# Patient Record
Sex: FEMALE | Race: BLACK OR AFRICAN AMERICAN
[De-identification: names, ages, dates, MRNs, and addresses within clinical notes are randomized per-mention and may not be internally consistent; named-entity substitution may affect disease eponyms.]

---

## 2019-01-16 VITALS — SYSTOLIC BLOOD PRESSURE: 158 MMHG | DIASTOLIC BLOOD PRESSURE: 76 MMHG

## 2021-06-02 ENCOUNTER — HOSPITAL ENCOUNTER (OUTPATIENT)
Dept: HOSPITAL 63 - MAMMO | Age: 86
End: 2021-06-02
Attending: SPECIALIST
Payer: MEDICARE

## 2021-06-02 DIAGNOSIS — N63.20: Primary | ICD-10-CM

## 2021-06-02 DIAGNOSIS — N63.10: ICD-10-CM

## 2021-06-02 PROCEDURE — 77066 DX MAMMO INCL CAD BI: CPT

## 2021-06-02 PROCEDURE — 76641 ULTRASOUND BREAST COMPLETE: CPT

## 2021-06-02 NOTE — RAD
DATE: 6/2/2021



EXAM: DIGITAL DIAGNOSTIC BILATERAL, BREAST BILATERAL



HISTORY: Right breast lump



COMPARISON: None



This study was interpreted with the benefit of Computerized Aided Detection

(CAD).





Breast Density: DENSE The breast parenchyma is dense, which could reduce the

sensitivity of mammography. Breast parenchyma level density D.





FINDINGS: The mammogram is extremely limited due to difficulty with patient

positioning, small breast size, and the presence of numerous skin moles.

Limited CC and LM views of the right breast were obtained and LM view only of

the left breast was obtained.  The technician made multiple attempts for

better images but this was the best possible imaging. 



Right breast: There is a large masslike area in the retroareolar region

measuring at least 3.9 x 3.0 cm. There is some distortion of the surrounding

breast tissue. The breast is very nodular in appearance. There are some

calcifications within the mass.



Left breast: Limited single view of the left breast demonstrates multiple

nodular densities in the inferior breast. These may correspond with moles.

There are scattered calcifications. There is diffusely increased breast

density which may be technical and due to limitations in the exam.



ULTRASOUND:



Right breast: The right breast was scanned in the area of palpable concern.

There is a large shadowing heterogeneous mass just deep to the skin at 7:00 in

the periareolar region of the right breast measuring 3.6 x 3.8 x 1.8 cm. This

has irregular borders and multiple echogenic foci consistent with

calcifications. The mass is reportedly nonmobile and hard. The breast is

difficult to penetrate. No axillary lymphadenopathy.



Left breast: The inferior left breast was scanned from 5:00 to 7:00. There is

normal fibroglandular tissue. No mass or cyst. Findings on mammogram may

correspond with skin moles.



IMPRESSION:

1. 3.8 cm mass in the right breast suspicious for malignancy.



2. Technically very limited mammogram due to patient immobility and the

presence of multiple skin moles. Additional suspicious masses or other

abnormalities could be missed in either breast.



A message regarding findings and recommendations with a callback number were

left for Dr. Jesús Soto at 4:30 PM on 6/2/2021.



BI-RADS CATEGORY: 5 HIGHLY SUGGESTIVE MALIGNANCY



RECOMMENDED FOLLOW-UP: BIO BIOPSY RECOMMENDED



PQRS compliance statement: Patient information was entered into a reminder

system with a target due date for the next mammogram.



Mammography is a sensitive method for finding small breast cancers, but it

does not detect them all and is not a substitute for careful clinical

examination.  A negative mammogram does not negate a clinically suspicious

finding and should not result in delay in biopsying a clinically suspicious

abnormality.



"Our facility is accredited by the [employer] Mammography

Program."

## 2021-06-09 ENCOUNTER — HOSPITAL ENCOUNTER (INPATIENT)
Dept: HOSPITAL 63 - ER | Age: 86
LOS: 2 days | Discharge: HOSPICE | DRG: 640 | End: 2021-06-11
Attending: HOSPITALIST | Admitting: HOSPITALIST
Payer: MEDICARE

## 2021-06-09 VITALS — BODY MASS INDEX: 18.91 KG/M2 | HEIGHT: 60 IN | WEIGHT: 96.34 LBS

## 2021-06-09 VITALS — SYSTOLIC BLOOD PRESSURE: 98 MMHG | DIASTOLIC BLOOD PRESSURE: 57 MMHG

## 2021-06-09 DIAGNOSIS — I12.9: ICD-10-CM

## 2021-06-09 DIAGNOSIS — E87.0: ICD-10-CM

## 2021-06-09 DIAGNOSIS — I24.8: ICD-10-CM

## 2021-06-09 DIAGNOSIS — E43: ICD-10-CM

## 2021-06-09 DIAGNOSIS — E86.0: Primary | ICD-10-CM

## 2021-06-09 DIAGNOSIS — F03.90: ICD-10-CM

## 2021-06-09 DIAGNOSIS — D63.8: ICD-10-CM

## 2021-06-09 DIAGNOSIS — K59.00: ICD-10-CM

## 2021-06-09 DIAGNOSIS — Z51.5: ICD-10-CM

## 2021-06-09 DIAGNOSIS — N63.10: ICD-10-CM

## 2021-06-09 DIAGNOSIS — R53.81: ICD-10-CM

## 2021-06-09 DIAGNOSIS — N18.9: ICD-10-CM

## 2021-06-09 DIAGNOSIS — Z66: ICD-10-CM

## 2021-06-09 DIAGNOSIS — E87.8: ICD-10-CM

## 2021-06-09 DIAGNOSIS — R64: ICD-10-CM

## 2021-06-09 LAB
ALBUMIN SERPL-MCNC: 3.2 G/DL (ref 3.4–5)
ALBUMIN/GLOB SERPL: 0.9 {RATIO} (ref 1–1.7)
ALP SERPL-CCNC: 271 U/L (ref 46–116)
ALT SERPL-CCNC: 14 U/L (ref 14–59)
AMM URATE CRY UR QL COMP ASSIST: PRESENT /HPF
ANION GAP SERPL CALC-SCNC: 18 MMOL/L (ref 6–14)
APTT PPP: YELLOW S
AST SERPL-CCNC: 36 U/L (ref 15–37)
BACTERIA #/AREA URNS HPF: 0 /HPF
BASOPHILS # BLD AUTO: 0.1 X10^3/UL (ref 0–0.2)
BASOPHILS NFR BLD: 1 % (ref 0–3)
BILIRUB SERPL-MCNC: 3.3 MG/DL (ref 0.2–1)
BILIRUB UR QL STRIP: (no result)
BUN/CREAT SERPL: 50 (ref 6–20)
CA-I SERPL ISE-MCNC: 85 MG/DL (ref 7–20)
CALCIUM SERPL-MCNC: 9.6 MG/DL (ref 8.5–10.1)
CHLORIDE SERPL-SCNC: 120 MMOL/L (ref 98–107)
CO2 SERPL-SCNC: 22 MMOL/L (ref 21–32)
CREAT SERPL-MCNC: 1.7 MG/DL (ref 0.6–1)
EOSINOPHIL NFR BLD: 0 % (ref 0–3)
EOSINOPHIL NFR BLD: 0 X10^3/UL (ref 0–0.7)
ERYTHROCYTE [DISTWIDTH] IN BLOOD BY AUTOMATED COUNT: 16.4 % (ref 11.5–14.5)
FIBRINOGEN PPP-MCNC: CLEAR MG/DL
GFR SERPLBLD BASED ON 1.73 SQ M-ARVRAT: 34.2 ML/MIN
GLOBULIN SER-MCNC: 3.7 G/DL (ref 2.2–3.8)
GLUCOSE SERPL-MCNC: 94 MG/DL (ref 70–99)
GLUCOSE UR STRIP-MCNC: (no result) MG/DL
HCT VFR BLD CALC: 28 % (ref 36–47)
HGB BLD-MCNC: 8.7 G/DL (ref 12–15.5)
LYMPHOCYTES # BLD: 0.8 X10^3/UL (ref 1–4.8)
LYMPHOCYTES NFR BLD AUTO: 9 % (ref 24–48)
MCH RBC QN AUTO: 32 PG (ref 25–35)
MCHC RBC AUTO-ENTMCNC: 31 G/DL (ref 31–37)
MCV RBC AUTO: 103 FL (ref 79–100)
MONO #: 0.6 X10^3/UL (ref 0–1.1)
MONOCYTES NFR BLD: 7 % (ref 0–9)
NEUT #: 7 X10^3UL (ref 1.8–7.7)
NEUTROPHILS NFR BLD AUTO: 83 % (ref 31–73)
NITRITE UR QL STRIP: (no result)
PLATELET # BLD AUTO: 220 X10^3/UL (ref 140–400)
POTASSIUM SERPL-SCNC: 3.7 MMOL/L (ref 3.5–5.1)
PROT SERPL-MCNC: 6.9 G/DL (ref 6.4–8.2)
RBC # BLD AUTO: 2.72 X10^6/UL (ref 3.5–5.4)
RBC #/AREA URNS HPF: (no result) /HPF (ref 0–2)
SODIUM SERPL-SCNC: 160 MMOL/L (ref 136–145)
SP GR UR STRIP: 1.02
SQUAMOUS #/AREA URNS LPF: (no result) /LPF
UROBILINOGEN UR-MCNC: 0.2 MG/DL
WBC # BLD AUTO: 8.4 X10^3/UL (ref 4–11)
WBC #/AREA URNS HPF: 0 /HPF (ref 0–4)

## 2021-06-09 PROCEDURE — 93005 ELECTROCARDIOGRAM TRACING: CPT

## 2021-06-09 PROCEDURE — 84484 ASSAY OF TROPONIN QUANT: CPT

## 2021-06-09 PROCEDURE — 81001 URINALYSIS AUTO W/SCOPE: CPT

## 2021-06-09 PROCEDURE — 85025 COMPLETE CBC W/AUTO DIFF WBC: CPT

## 2021-06-09 PROCEDURE — 36415 COLL VENOUS BLD VENIPUNCTURE: CPT

## 2021-06-09 PROCEDURE — 83880 ASSAY OF NATRIURETIC PEPTIDE: CPT

## 2021-06-09 PROCEDURE — 71045 X-RAY EXAM CHEST 1 VIEW: CPT

## 2021-06-09 PROCEDURE — 74018 RADEX ABDOMEN 1 VIEW: CPT

## 2021-06-09 PROCEDURE — 96361 HYDRATE IV INFUSION ADD-ON: CPT

## 2021-06-09 PROCEDURE — 80053 COMPREHEN METABOLIC PANEL: CPT

## 2021-06-09 PROCEDURE — 96374 THER/PROPH/DIAG INJ IV PUSH: CPT

## 2021-06-09 PROCEDURE — 80048 BASIC METABOLIC PNL TOTAL CA: CPT

## 2021-06-09 NOTE — PHYS DOC
Past History


Past Medical History:  Hypertension


Past Surgical History:  No Surgical History


Alcohol Use:  None


Drug Use:  None





General Adult


EDM:


Chief Complaint:  GENERALIZED BODY ACHES





HPI:


HPI:


89-year-old female presents via EMS with pain.  The patient and her family are 

concerned she has breast cancer but no diagnosis has been made.  She has felt a 

mass in the right breast.  She has been declining in health for some time.  She 

hardly ever eats.  She complains of nondescript pain for which she is treated 

with Tylenol at home.  This does seem to help somewhat according to family.  The

patient does not tell me any particular place of pain.  She complains of pain 

every time we try to adjust or remove her.  She does not provide any more 

significant history to me.





Review of Systems:


Review of Systems:


Constitutional:  Denies fever or chills 


Eyes:  Denies change in visual acuity 


HENT:  Denies nasal congestion or sore throat 


Respiratory:  Denies cough or shortness of breath 


Cardiovascular:  Denies chest pain 


GI: Abdominal pain, diarrhea 


: Denies dysuria 


Musculoskeletal: Generalized musculoskeletal pain


Integument:  Denies rash 


Neurologic:  Denies headache


Endocrine:  


Lymphatic:  Denies swollen glands 


Psychiatric: Unable to assess





Allergies:


Allergies:





Allergies








Coded Allergies Type Severity Reaction Last Updated Verified


 


  No Known Drug Allergies    1/10/19 No











Physical Exam:


PE:





Constitutional: Well developed, thin, frail no acute distress. []


HENT: Normocephalic, atraumatic, bilateral external ears normal, oropharynx dry,

 no oral exudates, nose normal. []


Eyes: PERRLA, EOMI, conjunctiva normal, no discharge. [] 


Neck: Normal range of motion, no tenderness, supple, no stridor. [] 


Cardiovascular: Heart rate regular rhythm, no murmur []


Lungs & Thorax:  Bilateral breath sounds clear to auscultation []


Abdomen: Bowel sounds normal, soft, generalized tenderness without guarding, no 

masses, no pulsatile masses. [] 


Skin: Warm, dry, no erythema, no rash. [] 


Back: No point tenderness [] 


Extremities: Pain with movement [] 


Neurologic: Alert, normal motor function, normal sensory function, no focal 

deficits noted. []


Psychologic: Affect blunted, judgement normal, mood depressed. []





EKG:


EKG:


[]





Radiology/Procedures:


Radiology/Procedures:


[]


Impressions:


EXAM: AP View of the chest





DATE: 6/9/2021 6:26 PM





INDICATION: Reason: Weakness, congestion / Spl. Instructions:  / History: 





COMPARISON: No Prior





FINDINGS:








The heart is not enlarged.





Mediastinal and hilar contours are normal.





Parenchymal opacities in the right hilum as well as the right lung base likely 

atelectasis. No consolidation.





No pleural effusion or pneumothorax.











IMPRESSION:


1.  Right perihilar and lung base airspace opacities should be further assessed 

by CT chest.








_______________________________________





EXAM: Supine AP view of the abdomen 





DATE: 6/9/2021 6:26 PM





INDICATION: Reason: Weakness, congestion / Spl. Instructions:  / History: 





COMPARISON: No Prior





FINDINGS:


No abnormal small or large bowel dilatation.  Moderate to large volume colonic 

stool content.  No abnormal soft tissue mass effect.  No suspicious 

calcifications are seen.  Evaluation for free intraperitoneal gas is limited on 

this supine exam. Aortic calcifications are seen. Decreased bone mineral 

density.





IMPRESSION:


1.  No evidence for bowel obstruction.


2.  Moderate to large volume colonic stool content. 





Electronically signed by: Sandeep Obregon MD (6/9/2021 7:40 PM) [facility]














DICTATED AND SIGNED BY:     SANDEEP OBREGON MD


DATE:     06/09/21 1938





CC: GABE LOPEZ DO; BRYAN PALENCIA MD ~MTH0 0





Heart Score:


C/O Chest Pain:  N/A


Risk Factors:


Risk Factors:  DM, Current or recent (<one month) smoker, HTN, HLP, family 

history of CAD, obesity.


Risk Scores:


Score 0 - 3:  2.5% MACE over next 6 weeks - Discharge Home


Score 4 - 6:  20.3% MACE over next 6 weeks - Admit for Clinical Observation


Score 7 - 10:  72.7% MACE over next 6 weeks - Early Invasive Strategies





Course & Med Decision Making:


Course & Med Decision Making


Pertinent Labs and Imaging studies reviewed. (See chart for details)


The patient's chest x-ray shows likely right-sided mass.  KUB shows moderate to 

large volume stool.  I suspect this constipation is noticed a portion of the 

patient's discomfort.  Have ordered 2 mg of morphine.  She was given Zofran by 

EMS.  I will admit the patient to the hospital for further evaluation of her 

chest mass and constipation.  The patient has an elevated sodium of 160.  She 

also has an elevated creatinine and troponin.  See labs for more details.  I 

started the patient on half NS.  Her EKG does not show ST elevation.  I spoke 

with the patient's family and they would like the patient admitted to this 

facility.  They have agreed that DNR status would be the best for the patient.  

I spoke with Dr. Bo the hospitalist and he has accepted the patient for 

admission.  He has requested to be switched to D5 water at 100 an hour. 


[]





Dragon Disclaimer:


Dragon Disclaimer:


This electronic medical record was generated, in whole or in part, using a voice

recognition dictation system.





Departure


Departure:


Impression:  


   Primary Impression:  


   Hypernatremia


   Additional Impressions:  


   Elevated troponin


   Elevated serum creatinine


   Chest mass


   Constipation


Disposition:  09 ADMITTED AS INPATIENT


Admitting Physician:  Saul Bo


Condition:  STABLE


Referrals:  


BRYAN PALENCIA MD (PCP)











GABE LOPEZ DO                  Jun 9, 2021 19:08

## 2021-06-09 NOTE — EKG
[facility] [address], [place] [address]

Test Date:    2021               Test Time:    21:48:22

Pat Name:     BERTIN SOLORIO         Department:   

Patient ID:   [id number]           Room:          

Gender:       F                        Technician:   

:          [id number]               Requested By: GABE LOPEZ

Order Number: [id number]            Reading MD:     

                                 Measurements

Intervals                              Axis          

Rate:         75                       P:            -46

MN:           134                      QRS:          79

QRSD:         74                       T:            41

QT:           400                                    

QTc:          449                                    

                           Interpretive Statements

SINUS RHYTHM

R-S TRANSITION ZONE IN V LEADS DISPLACED TO THE LEFT

OTHERWISE NORMAL ECG

RI6.02

No previous ECG available for comparison

## 2021-06-09 NOTE — RAD
EXAM: AP View of the chest



DATE: 6/9/2021 6:26 PM



INDICATION: Reason: Weakness, congestion / Spl. Instructions:  / History: 



COMPARISON: No Prior



FINDINGS:





The heart is not enlarged.



Mediastinal and hilar contours are normal.



Parenchymal opacities in the right hilum as well as the right lung base likely atelectasis. No consol
idation.



No pleural effusion or pneumothorax.







IMPRESSION:

1.  Right perihilar and lung base airspace opacities should be further assessed by CT chest.





_______________________________________



EXAM: Supine AP view of the abdomen 



DATE: 6/9/2021 6:26 PM



INDICATION: Reason: Weakness, congestion / Spl. Instructions:  / History: 



COMPARISON: No Prior



FINDINGS:

No abnormal small or large bowel dilatation.  Moderate to large volume colonic stool content.  No abn
ormal soft tissue mass effect.  No suspicious calcifications are seen.  Evaluation for free intraperi
toneal gas is limited on this supine exam. Aortic calcifications are seen. Decreased bone mineral den
sity.



IMPRESSION:

1.  No evidence for bowel obstruction.

2.  Moderate to large volume colonic stool content. 



Electronically signed by: Sandeep Jeronimo MD (6/9/2021 7:40 PM) [facility]

## 2021-06-09 NOTE — NUR
PT ADMITTED TO  VIA  EMS ACCOMPANIED BY ER STAFF. PT WAS TOTAL ASSIST X2 FROM GURNEY 
TO BED. PT ALERT TO SELF ONLY. PT POOR HISTORIAN. PT DPOA CALLED FOR HOME MED LIST AND 
MEDICAL HISTORY. DPOA COULD ONLY STATE PTS PHARMACY. DPOA GIVEN PT STATUS UPDATE AND NUMBER 
TO NURSES STATION. PT IS RESTING COMFORTABLY IN BED W/ BED ALARM ON.

## 2021-06-10 VITALS — SYSTOLIC BLOOD PRESSURE: 120 MMHG | DIASTOLIC BLOOD PRESSURE: 66 MMHG

## 2021-06-10 VITALS — DIASTOLIC BLOOD PRESSURE: 47 MMHG | SYSTOLIC BLOOD PRESSURE: 81 MMHG

## 2021-06-10 VITALS — SYSTOLIC BLOOD PRESSURE: 109 MMHG | DIASTOLIC BLOOD PRESSURE: 46 MMHG

## 2021-06-10 LAB
ANION GAP SERPL CALC-SCNC: 13 MMOL/L (ref 6–14)
CA-I SERPL ISE-MCNC: 87 MG/DL (ref 7–20)
CALCIUM SERPL-MCNC: 9.1 MG/DL (ref 8.5–10.1)
CHLORIDE SERPL-SCNC: 122 MMOL/L (ref 98–107)
CO2 SERPL-SCNC: 27 MMOL/L (ref 21–32)
CREAT SERPL-MCNC: 1.7 MG/DL (ref 0.6–1)
GFR SERPLBLD BASED ON 1.73 SQ M-ARVRAT: 34.2 ML/MIN
GLUCOSE SERPL-MCNC: 100 MG/DL (ref 70–99)
POTASSIUM SERPL-SCNC: 3.9 MMOL/L (ref 3.5–5.1)
SODIUM SERPL-SCNC: 162 MMOL/L (ref 136–145)

## 2021-06-10 RX ADMIN — Medication PRN MG: at 19:38

## 2021-06-10 RX ADMIN — Medication PRN MG: at 15:05

## 2021-06-10 NOTE — HP
ADMIT DATE: 06/10/2021

ATTENDING PHYSICIAN:  Saul Bo MD



CHIEF COMPLAINT:  Weakness.



HISTORY OF PRESENT ILLNESS:  The patient is an 89-year-old female admitted 

through the ED brought in by her son.  She has been declined.  She is 

contracted, nonambulatory and profoundly demented.  Supposedly, she has a 

diagnosis of breast cancer.  There is an obvious fungating hard lesion in the 

lateral portion of the right breast.  This is cancer until proven otherwise.  

She has not sought any treatment.  She is profoundly cachectic.  She is 

dehydrated.  Her serum sodium was 162 mEq per liter with a concomitant chloride 

of 122.  She is not on a diuretic.  She was given IV fluids in the ED.  The 

patient is demented.  The son is not here right now.  Much of the history is 

obtained from the chart.



PAST MEDICAL HISTORY:  Significant for the generalized dementia, generalized 

debilitation.  Her son has been caring for her.  She is perla and 

nonambulatory.  Her appetite has been poor.  She has protein calorie 

malnutrition.



CURRENT MEDICATIONS:  Unknown.



ALLERGIES:  She has no recorded drug allergies.  Her medicines at home include 

Aricept, pravastatin, diazepam and amlodipine.  I suspect there is a history of 

hypertension and chronic constipation.



FAMILY HISTORY:  Unobtainable.



REVIEW OF SYSTEMS:  Unobtainable.



PHYSICAL EXAMINATION:

GENERAL:  When I saw her, this is a cachectic elderly female, who is very 

demented.

VITAL SIGNS:  Initial vital signs showed a blood pressure 111/51, pulse is 70 

and regular.  She was afebrile, oxygen saturation 96% on room air.

HEENT:  Head is without trauma.  Pupils are reactive.  Sclerae is nonicteric.  

The oropharynx is clear.  Mucous membranes dry.

NECK:  Supple, no bruits.

LUNGS:  Shallow respirations.

CARDIOVASCULAR:  Showed distant heart tones.  No gallops.

BREAST:  There is a hard fungating lesion on the lateral portion of the right 

breast.  I did not palpate any axillary lymph nodes.

ABDOMEN:  Soft.

EXTREMITIES:  Shows contracture.  There is no edema.

NEUROLOGIC: Profoundly demented.

SKIN:  Warm and dry.



PERTINENT LABORATORY STUDIES:  Sodium 162 mEq with a chloride of 122.  Cardiac 

enzymes also positive troponins.  Her is 8.7 g/dL.  Chest x-ray showed COPD 

changes without any acute infiltrates.



ASSESSMENT:

1.  An 89-year-old female with dehydration.  She has a free water deficit with 

concomitant hypernatremia.

2.  Profound dementia.

3.  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

4.  Anemia of chronic disease.

5.  Elevated troponins due to stress demand ischemia.

6.  Chronic kidney disease.

7.  Protein calorie malnutrition, severe.

8.  Profound debilitation.



PLAN:

1.  Admit to the inpatient unit.

2.  I will try to contact the son, who has been a caregiver.  This patient is 

actively dying and she should go to hospice.

3.  Gentle IV hydration.

4.  Follow up serial chemistry.

5.  Comfort measures.  She is a DNR per advanced directives and that just was 

obtained last night.







YESENIA

DR: Gonzalo   DD: 06/10/2021 08:30

DT: 06/10/2021 08:41   TID: [id number]

CC:     Tala Cartwright

## 2021-06-11 VITALS — DIASTOLIC BLOOD PRESSURE: 62 MMHG | SYSTOLIC BLOOD PRESSURE: 111 MMHG

## 2021-06-11 RX ADMIN — Medication PRN MG: at 05:58

## 2021-06-11 RX ADMIN — Medication PRN MG: at 08:56

## 2021-06-11 NOTE — NUR
PATIENT IS DISCHARGED HOME WITH HOSPICE CARE. DISCHARGE INSTRUCTION AND MEDICATIONS 
REVIEWED, FAMILY MEMBER VERBALIZED UNDERSTANDING. PATIENT LEFT ROOM VIA EMS ACCOMP BY FAMILY 
MEMBER.

## 2021-06-11 NOTE — DS
DATE OF DISCHARGE: 06/11/2021

ATTENDING PHYSICIAN:  Dr. Bo.



DATE OF ADMISSION:.  June 2021



FINAL DISCHARGE DIAGNOSES:

1.  Profound dehydration and decline.

2.  Concomitant hypernatremia and hyperchloremia.

3.  Obvious findings of carcinoma of the breast, right lateral quadrant.

4.  Dementia with debilitation.

5.  Elevated troponins due to stress demand ischemia.

6.  Chronic kidney disease.

7.  Protein-calorie malnutrition, severe.



HISTORY AND PHYSICAL:  The patient is an 89-year-old female cared for at home by

her family, she is in decline.  She is demented.  She was profoundly dehydrated 

with a serum admission sodium of 160 mEq/L.  She was admitted for comfort 

measures.  She is now a DNR.



PHYSICAL EXAMINATION:  Please see my dictated note.



PERTINENT LABORATORY AND X-RAY STUDIES:  Hemoglobin on admission was 8.7 g/dL, 

white count 8400.  Sodium 160 mEq, chloride 120, potassium 3.9 mEq.  Cardiac 

enzymes 0.14, 0.15 and 0.15 respectively.  Serum creatinine level is 1.7 mg % 

with a BUN of 87.



COURSE IN THE HOSPITAL:  The patient was admitted.  She was given comfort 

measures with pain meds, IV hydration; however, she pulled 3 IVs out and we were

unable to maintain an intravenous line.  We consulted the hospice team and they 

have agreed to see the patient to keep her comfortable during the final days.  

Pain was then managed with oral Roxanol as well as her Duragesic patch.  On the 

third hospital day, arrangements were made for the patient to return home.  She 

will have hospice care.  In addition, I recommended Roxanol 10 mg p.o. every 2 

hours p.r.n. pain or air hunger and finally a Duragesic 50 mcg patch.  Her 

prognosis is terminal.  She was discharged in stable condition with a definite 

plan for end of life care with our hospice team.



Total discharge time spent 39 minutes.







TIEN/RENE

DR: TIEN/toni   DD: 06/11/2021 11:02

DT: 06/11/2021 19:45   TID: [id number]

CC:     BRYAN PALENCIA MD